# Patient Record
Sex: FEMALE | Race: WHITE | NOT HISPANIC OR LATINO
[De-identification: names, ages, dates, MRNs, and addresses within clinical notes are randomized per-mention and may not be internally consistent; named-entity substitution may affect disease eponyms.]

---

## 2018-07-03 ENCOUNTER — APPOINTMENT (OUTPATIENT)
Dept: RADIOLOGY | Facility: CLINIC | Age: 33
End: 2018-07-03

## 2018-07-03 ENCOUNTER — OUTPATIENT (OUTPATIENT)
Dept: OUTPATIENT SERVICES | Facility: HOSPITAL | Age: 33
LOS: 1 days | End: 2018-07-03
Payer: COMMERCIAL

## 2018-07-03 PROBLEM — Z00.00 ENCOUNTER FOR PREVENTIVE HEALTH EXAMINATION: Status: ACTIVE | Noted: 2018-07-03

## 2018-07-03 PROCEDURE — 73610 X-RAY EXAM OF ANKLE: CPT

## 2018-07-03 PROCEDURE — 73630 X-RAY EXAM OF FOOT: CPT

## 2018-07-03 PROCEDURE — 73610 X-RAY EXAM OF ANKLE: CPT | Mod: 26,LT

## 2018-07-03 PROCEDURE — 73630 X-RAY EXAM OF FOOT: CPT | Mod: 26,50

## 2021-08-24 ENCOUNTER — EMERGENCY (EMERGENCY)
Facility: HOSPITAL | Age: 36
LOS: 1 days | Discharge: HOME | End: 2021-08-24
Attending: EMERGENCY MEDICINE | Admitting: EMERGENCY MEDICINE
Payer: COMMERCIAL

## 2021-08-24 VITALS
HEART RATE: 61 BPM | RESPIRATION RATE: 15 BRPM | DIASTOLIC BLOOD PRESSURE: 70 MMHG | TEMPERATURE: 98 F | OXYGEN SATURATION: 98 % | WEIGHT: 119.93 LBS | SYSTOLIC BLOOD PRESSURE: 112 MMHG | HEIGHT: 69 IN

## 2021-08-24 DIAGNOSIS — K50.90 CROHN'S DISEASE, UNSPECIFIED, WITHOUT COMPLICATIONS: ICD-10-CM

## 2021-08-24 DIAGNOSIS — H57.11 OCULAR PAIN, RIGHT EYE: ICD-10-CM

## 2021-08-24 DIAGNOSIS — H53.141 VISUAL DISCOMFORT, RIGHT EYE: ICD-10-CM

## 2021-08-24 DIAGNOSIS — H53.149 VISUAL DISCOMFORT, UNSPECIFIED: ICD-10-CM

## 2021-08-24 DIAGNOSIS — H11.432 CONJUNCTIVAL HYPEREMIA, LEFT EYE: ICD-10-CM

## 2021-08-24 DIAGNOSIS — Z86.69 PERSONAL HISTORY OF OTHER DISEASES OF THE NERVOUS SYSTEM AND SENSE ORGANS: ICD-10-CM

## 2021-08-24 PROCEDURE — 99283 EMERGENCY DEPT VISIT LOW MDM: CPT

## 2021-08-24 RX ORDER — FLUORESCEIN SODIUM 9 MG
1 STRIP OPHTHALMIC (EYE) ONCE
Refills: 0 | Status: COMPLETED | OUTPATIENT
Start: 2021-08-24 | End: 2021-08-24

## 2021-08-24 RX ORDER — CYCLOPENTOLATE HYDROCHLORIDE 10 MG/ML
1 SOLUTION/ DROPS OPHTHALMIC ONCE
Refills: 0 | Status: COMPLETED | OUTPATIENT
Start: 2021-08-24 | End: 2021-08-24

## 2021-08-24 RX ORDER — PREDNISOLONE SODIUM PHOSPHATE 1 %
1 DROPS OPHTHALMIC (EYE)
Qty: 5 | Refills: 0
Start: 2021-08-24 | End: 2021-08-30

## 2021-08-24 RX ADMIN — CYCLOPENTOLATE HYDROCHLORIDE 1 DROP(S): 10 SOLUTION/ DROPS OPHTHALMIC at 18:28

## 2021-08-24 RX ADMIN — Medication 1 APPLICATION(S): at 18:22

## 2021-08-24 RX ADMIN — Medication 1 DROP(S): at 18:22

## 2021-08-24 NOTE — ED PROVIDER NOTE - CLINICAL SUMMARY MEDICAL DECISION MAKING FREE TEXT BOX
37yo F hx of crohns and recurrent R iridocyclitis presents with R eye redness, pain, photophobia x1 day, typical of iridocyclitis sx in the past.  On exam afebrile, VSS, well appearing, with R eye conjunctival injection, nml appearing pupils, nml visual acuity.  No eye discharge.  Fluorescein exam negative for corneal abrasion.  Suspect iridocyclitis. 35yo F hx of crohns and recurrent R iridocyclitis presents with R eye redness, pain, photophobia x1 day, typical of iridocyclitis sx in the past.  On exam afebrile, VSS, well appearing, with R eye conjunctival injection, nml appearing pupils, nml visual acuity.  No eye discharge.  Fluorescein exam negative for corneal abrasion.  Suspect iridocyclitis.  No symptoms to suggest bacterial/ viral/ allergic conjunctivitis at this time.  Pt feels better after topical cycloplegic.  Will give topical steroid; pt instructed numerous times to follow up immediately with ophthalmologist for further evaluation.  Danger of inappropriate topical steroid use discussed with pt as well who verbalized understanding.

## 2021-08-24 NOTE — ED PROVIDER NOTE - NSFOLLOWUPINSTRUCTIONS_ED_ALL_ED_FT
TOPICAL STEROIDS FOR THE EYE CAN BE EXTREMELY DANGEROUS IF NOT USED APPROPRIATELY.  PLEASE SEE YOUR OPHTHALMOLOGIST TOMORROW AS SCHEDULED TO DETERMINE WHAT MEDICATION YOU NEED AND THE FREQUENCY OF THE MEDICATION.        Uveitis    Uveitis is swelling and irritation in the eye. Most of the time, it affects the middle part of the eye (uvea). There are many types of uveitis:  •Iritis. This type affects the colored part of the eye.      •Intermediate uveitis. This type affects the middle of the eye.      •Posterior uveitis. This type affects the back of the eye.      •Panuveitis. This type affects all layers of the eye.      Uveitis can affect one eye or both eyes. Over time, the condition may lead to vision loss.      Follow these instructions at home:     •Take over-the-counter and prescription medicines only as told by your doctor.      •Follow instructions for safely putting eye drops in your eyes.      •Drink enough fluid to keep your pee (urine) pale yellow.      •Follow instructions from your doctor about what activities are safe for you.      • Do not use any products that contain nicotine or tobacco, such as cigarettes and e-cigarettes. If you need help quitting, ask your doctor.      •Keep all follow-up visits as told by your doctor. This is important.        Contact a doctor if:    •Your symptoms do not get better.        Get help right away if:    •You cannot see as much as you did before.      •You have more redness in one eye or both eyes.      •Light bothers your eyes a lot.      •You have pain in your eye.      •You have aching in your eye.        Summary    •Uveitis is swelling and irritation in the eye. Most of the time, it affects the middle part of the eye (uvea).      •Uveitis can affect one eye or both eyes. Over time, the condition may lead to vision loss.      •Follow instructions for safely putting eye drops in your eyes.      •Keep all follow-up visits as told by your doctor. This is important.      This information is not intended to replace advice given to you by your health care provider. Make sure you discuss any questions you have with your health care provider.

## 2021-08-24 NOTE — ED ADULT NURSE NOTE - NSIMPLEMENTINTERV_GEN_ALL_ED
Implemented All Universal Safety Interventions:  [place] to call system. Call bell, personal items and telephone within reach. Instruct patient to call for assistance. Room bathroom lighting operational. Non-slip footwear when patient is off stretcher. Physically safe environment: no spills, clutter or unnecessary equipment. Stretcher in lowest position, wheels locked, appropriate side rails in place.

## 2021-08-24 NOTE — ED PROVIDER NOTE - PHYSICAL EXAMINATION
Constitutional:  Well appearing, awake, alert, oriented to person, place, time/situation and in no apparent distress.  HEENT: Airway patent, Nasal mucosa clear. Mouth with normal mucosa. Throat has no vesicles, no oropharyngeal exudates and uvula is midline.  Eyes: R eye:  +conjunctival injection, pupils 3mm equal round and reactive b/l.  Visual acuity OD 20/20   Cardiac: Normal rate, regular rhythm.  Respiratory: Breath sounds clear and equal bilaterally.  GI: Abdomen soft, non-tender, no guarding.  MSK: Spine appears normal, range of motion is not limited, no muscle or joint tenderness  Neuro: Alert and oriented, no focal deficits, no motor or sensory deficits.  Skin: Skin normal color for race, warm, dry and intact. No evidence of rash.

## 2021-08-24 NOTE — ED PROVIDER NOTE - PATIENT PORTAL LINK FT
You can access the FollowMyHealth Patient Portal offered by [facility] by registering at the following website: [email or web address]. By joining [facility]’s FollowMyHealth portal, you will also be able to view your health information using other applications (apps) compatible with our system.

## 2021-08-24 NOTE — ED PROVIDER NOTE - NS ED ROS FT
Constitutional:  No fever, No chills, No night sweats  Eyes:  No visual changes, No discharge, + redness  ENMT:  No epistaxis, no nasal congestion, no throat pain, no difficulty swallowing  CV:  No chest pain, No palpitations, No peripheral edema  Resp:  No cough, No shortness of breath  GI:  No abdominal pain, No vomiting, No diarrhea  MSK:  No neck pain or stiffness, No joint swelling or pain, No back pain  Neuro: no loss of consciousness, no gait abnormality, no headache, no sensory deficits, and no weakness.  Skin:  No abrasions, no lesions, no rashes  Psych:  No known mental health issues

## 2021-08-24 NOTE — ED PROVIDER NOTE - OBJECTIVE STATEMENT
35yo F hx of Crohns, hx of recurrent R iridocyclitis, presents with R eye redness, pain, and sensitivity to light 35yo F hx of Crohns, hx of recurrent R iridocyclitis, presents with R eye redness, pain, and sensitivity to light x1 day.  Symptoms are typical of symptoms pt has had with 35yo F hx of Crohns, hx of recurrent R iridocyclitis, presents with R eye redness, pain, and sensitivity to light x1 day.  Symptoms are typical of symptoms pt has had with iridocyclitis in the past.  Usually given topical steroid by ophthalmologist with resolution in symptoms.  Tried to see her ophthalmologist today, but could only make appt for tomorrow.  No change in vision.  No pain with eye movements.  No trauma to eye.  No discharge from eye.

## 2021-08-24 NOTE — ED ADULT NURSE NOTE - OBJECTIVE STATEMENT
Pt aox3 with steady gait. pt states right eye redness since this am denies trauma or drainage. No vision changes but increased photophobia
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
## 2024-11-12 NOTE — ED ADULT TRIAGE NOTE - HEART RATE (BEATS/MIN)
Reina is a 58 year old who is being evaluated via a billable video visit.    How would you like to obtain your AVS? MyChart  If the video visit is dropped, the invitation should be resent by: Text to cell phone: [phone]    Felipe Huang is a 58 year old, presenting for the following health issues:  Pre-Op Exam    HPI       Physical Exam      60